# Patient Record
Sex: FEMALE | Race: WHITE | NOT HISPANIC OR LATINO | Employment: STUDENT | URBAN - METROPOLITAN AREA
[De-identification: names, ages, dates, MRNs, and addresses within clinical notes are randomized per-mention and may not be internally consistent; named-entity substitution may affect disease eponyms.]

---

## 2022-09-23 ENCOUNTER — TELEPHONE (OUTPATIENT)
Dept: PSYCHIATRY | Facility: CLINIC | Age: 22
End: 2022-09-23
Payer: COMMERCIAL

## 2022-09-23 NOTE — TELEPHONE ENCOUNTER
Spoke with pt and informed pt that we are not accepting new pt at this time     ----- Message from Kayleigh Rivera sent at 9/22/2022  2:31 PM CDT -----  Regarding: Voicemail  Called for np appointment

## 2022-09-27 ENCOUNTER — TELEPHONE (OUTPATIENT)
Dept: PSYCHIATRY | Facility: CLINIC | Age: 22
End: 2022-09-27
Payer: COMMERCIAL

## 2022-12-15 ENCOUNTER — OFFICE VISIT (OUTPATIENT)
Dept: PSYCHIATRY | Facility: CLINIC | Age: 22
End: 2022-12-15
Payer: COMMERCIAL

## 2022-12-15 DIAGNOSIS — F90.8 ADHD, ADULT RESIDUAL TYPE: Primary | ICD-10-CM

## 2022-12-15 DIAGNOSIS — Z63.9 FAMILY CIRCUMSTANCE: ICD-10-CM

## 2022-12-15 DIAGNOSIS — F32.A DEPRESSION, UNSPECIFIED DEPRESSION TYPE: ICD-10-CM

## 2022-12-15 PROCEDURE — 99211 OFF/OP EST MAY X REQ PHY/QHP: CPT | Mod: PBBFAC | Performed by: SOCIAL WORKER

## 2022-12-15 PROCEDURE — 90791 PR PSYCHIATRIC DIAGNOSTIC EVALUATION: ICD-10-PCS | Mod: S$GLB,,, | Performed by: SOCIAL WORKER

## 2022-12-15 PROCEDURE — 90791 PSYCH DIAGNOSTIC EVALUATION: CPT | Mod: S$GLB,,, | Performed by: SOCIAL WORKER

## 2022-12-15 PROCEDURE — 99999 PR PBB SHADOW E&M-EST. PATIENT-LVL I: CPT | Mod: PBBFAC,,, | Performed by: SOCIAL WORKER

## 2022-12-15 PROCEDURE — 99999 PR PBB SHADOW E&M-EST. PATIENT-LVL I: ICD-10-PCS | Mod: PBBFAC,,, | Performed by: SOCIAL WORKER

## 2022-12-15 SDOH — SOCIAL DETERMINANTS OF HEALTH (SDOH): PROBLEM RELATED TO PRIMARY SUPPORT GROUP, UNSPECIFIED: Z63.9
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
## 2023-02-13 ENCOUNTER — TELEPHONE (OUTPATIENT)
Dept: PSYCHIATRY | Facility: CLINIC | Age: 23
End: 2023-02-13
Payer: COMMERCIAL

## 2023-02-17 ENCOUNTER — TELEPHONE (OUTPATIENT)
Dept: PSYCHIATRY | Facility: CLINIC | Age: 23
End: 2023-02-17
Payer: COMMERCIAL

## 2023-03-07 ENCOUNTER — TELEPHONE (OUTPATIENT)
Dept: PSYCHIATRY | Facility: CLINIC | Age: 23
End: 2023-03-07
Payer: COMMERCIAL

## 2023-03-22 ENCOUNTER — TELEPHONE (OUTPATIENT)
Dept: PSYCHIATRY | Facility: CLINIC | Age: 23
End: 2023-03-22
Payer: COMMERCIAL

## 2023-10-19 ENCOUNTER — CLINICAL SUPPORT (OUTPATIENT)
Dept: REHABILITATION | Facility: HOSPITAL | Age: 23
End: 2023-10-19
Attending: OBSTETRICS & GYNECOLOGY
Payer: COMMERCIAL

## 2023-10-19 DIAGNOSIS — R35.0 URINARY FREQUENCY: ICD-10-CM

## 2023-10-19 DIAGNOSIS — N81.4 UTERINE PROLAPSE: ICD-10-CM

## 2023-10-19 DIAGNOSIS — M62.89 PELVIC FLOOR DYSFUNCTION: ICD-10-CM

## 2023-10-19 PROCEDURE — 97530 THERAPEUTIC ACTIVITIES: CPT | Mod: PN

## 2023-10-19 PROCEDURE — 97535 SELF CARE MNGMENT TRAINING: CPT | Mod: PN

## 2023-10-19 PROCEDURE — 97161 PT EVAL LOW COMPLEX 20 MIN: CPT | Mod: PN

## 2023-10-19 PROCEDURE — 97140 MANUAL THERAPY 1/> REGIONS: CPT | Mod: PN

## 2023-10-19 NOTE — PATIENT INSTRUCTIONS
"  URINARY URGE CONTROL   What to do when you "gotta go, gotta go"     FREEZE, BREATHE, SQUEEZE, repeat  Do this when you experience a strong urge to urinate and feel like you are going to leak to stop yourself from leaking.      FIRST - FREEZE: Do your best not to panic or rush to the toilet! You are more likely to leak if you do. Stop whatever you are doing, stand or sit quietly, and stay as calm as possible.     SECOND - BREATHE: Relax and take a few good deep breaths, letting it out slowly. This helps you further calm the nervous system and settles your bladder. Try thinking of something else to distract yourself from the urge (example: list categories of items like animals, fruits, cars, etc.)     THIRD - SQUEEZE: Do 5-10 "Quick Flicks" (quick LIFT and squeeze of pelvic floor muscles, followed by a full DROP). Pelvic floor contractions send a message to the bladder to relax and hold urine. Continue doing your best to remain calm and distract yourself from the urge.     FINALLY - REPEAT: Repeat this as many times as you need to on the way to the bathroom (i.e., every time the urge comes back). We only want to be walking calmly to the bathroom once the urge has passed. When you get inside and close the door behind you, repeat this process one last time so that you have enough time to get to the toilet and pull your pants down without rushing.        Remember......"Control your bladder before it controls YOU!"     DOUBLE VOIDING    Sometimes after you urinate, you may feel the urge to go again immediately or soon after. However, when you go back to the bathroom, only a few drops come out. This can be due to incomplete emptying of the bladder. Double voiding is a technique that may assist the bladder to empty more effectively when urine is left in the bladder at the end of urination. It involves passing urine more than once each time that you go to the toilet. This makes sure that the bladder is completely " empty.    Here are 3 strategies you can try to fully empty your bladder.  You do not have to do all of these things every single time. Find which ones work best for you.     Check to make sure your pelvic floor is relaxed, which is required for voiding completely.   Do a body scan - make sure your legs, buttocks, and abdominals are relaxed.  Take a couple deep, slow breaths to encourage your pelvic floor muscles to DROP (i.e., try diaphragmatic breathing).  Gently apply pressure over your bladder.  Change your pelvic position: lean forward, rock your pelvis forward and backward 2x and side to side 2x, stand up detention then sit back down 2x.     Wait at least 15-30 seconds to see if a second urine stream begins. If not, you may get up and leave the bathroom.

## 2023-10-19 NOTE — PROGRESS NOTES
[facility]   Pelvic Health Physical Therapy Initial Evaluation     Date: 10/19/2023   Name: Rain Garcia  Clinic Number: [id number]    Therapy Diagnosis:   Encounter Diagnoses   Name Primary?    Uterine prolapse     Urinary frequency      Physician: Maria Eugenia Pope MD    Physician Orders: PT Eval and Treat   Medical Diagnosis from Referral: Uterine prolapse [N81.4], Urinary frequency [R35.0]  Evaluation Date: 10/19/2023  Authorization Period Expiration: 2023  Plan of Care Expiration: 2023  Progress Note Due: 2023  Visit # / Visits authorized:  eval   FOTO: Issued Visit #: 1 /3    Precautions: Standard    Time In: 9:00  Time Out: 10:00  Total Appointment Time (timed & untimed codes): 60 minutes    SUBJECTIVE     Date of onset: 2021    History of current condition - Rain reports:  on 2021. Since delivery, she has had an increase frequency in urination and constipation. She states that nothing seems to be normal about her pelvic floor since delivery. She states that the baby was resting on her bladder. She has frequent UTI symptoms as well. She recently went to urgent care for a urinalysis and was negative for UTI however was continuing to experience symptoms. She reports intercourse has not been enjoyable however also not painful. She reports possible decrease in libido since pregnancy. She states that she is very dehydrated at times because when she drinks water she has an increase in urinary frequency. She notices that she can control her urge if she does not think about it, however once she thinks about voiding, she has a very strong urge. She has a perifit however has not used it recently. Would like to have another baby in the future, but would like to improve pelvic floor symptoms she is experiencing right now.    OB/GYN History: , caesarean, and painful periods, copper IUD (recently had strings trimmed). Chronic  UTIs  Sexually active? Yes  Pain with vaginal exams, intercourse or tampon use? Yes with tampon use    Bladder/Bowel History: slow stream, trouble emptying bladder completely, and constipation/straining for movement  Frequency of urination:   Daytime: every hour           Nighttime: 0-1  Difficulty initiating urine stream: No  Urine stream: weak and interrupted  Complete emptying: No  Bladder leakage: No  Frequency of incidents: none  Amount leaked (urine):  none  Urinary Urgency: Yes, Able to delay the urge for at least 30 minute(s).  Frequency of bowel movements: 1-2 times a day, sometimes she can't go a day but will then go the next morning. Mainly has her bowel movements in the morning.  Difficulty initiating BM: Yes. Straining  Quality/Shape of BM: Johnston Stool Chart Type 4 .   Does Patient Feel Empty after BM? No  Fiber Supplements or Laxative Use? No  Colon leakage: No  Frequency of incidents: none   Amount leaked (bowels):  none  Form of protection: none  Number of pads required in 24 hours: 0    Pain:  Low back   Currently 2/10    Imaging: see chart (nothing since pregnancy)     Prior Therapy: no  Social History:  lives with their family  Current exercise: no but trying. Would like to get a treadmill  Occupation: Bridal stylist  Prior Level of Function: WFL  Current Level of Function: WFL; limited to certain activities due to urinary frequency     Types of fluid intake: water, coffee, and energy drinks.  1-2 large cups of coffee, tea for dinner.    Diet: regular    Abuse/Neglect: No     Patients goals: habit building, decrease urinary frequency     Medical History: Rain  has no past medical history on file.     Surgical History: Rain Garcia  has a past surgical history that includes  section and Tonsillectomy.    Medications: Rain has a current medication list which includes the following prescription(s): fluconazole, nitrofurantoin (macrocrystal-monohydrate), and norethindrone, and the  following Facility-Administered Medications: copper intrauterine device.    Allergies:   Review of patient's allergies indicates:  No Known Allergies     OBJECTIVE     See EMR under MEDIA for written consent provided 10/19/2023    ORTHO SCREEN  Posture in sitting: slouched   Pelvic alignment: no sign of deviations noted in supine     ABDOMINAL WALL ASSESSMENT  Palpation: boggy and hypotonic   Abdominal strength: Transverse abdominus: poor to fair  Scarring:  scar noted. Good mobility on and around scar   Diastasis: assess next visit    BREATHING MECHANICS ASSESSMENT   Thorax Assessment During Quiet Respiration: WNL excursion of ribcage  Thorax Assessment During Deep Respiration: WNL excursion of ribcage and WNL excursion of abdominal wall    VAGINAL PELVIC FLOOR EXAM    EXTERNAL ASSESSMENT  Introitus: stenotic  Skin condition: WNL  Scarring: none noted  Sensation: WNL   Pain: none  Voluntary contraction: visible lift  Voluntary relaxation: nil  Involuntary contraction: nil  Bearing down: bulge and nil      INTERNAL ASSESSMENT  Pain: tender areas noted as follows: bilateral bulbospongiosus, bilateral superficial transverse perineal, bilateral obturator internus  (manual trigger point release performed)  Sensation: able to localized pressure appropriately   Vaginal vault: stenotic   Muscle Bulk: hypertonus   Muscle Power: 3/5  Muscle Endurance: 5 sec    Quality of contraction: slow relaxation and incomplete relaxation   Coordination: tends to hold breath during PFM contration   Prolapse check: none      Limitation/Restriction for FOTO Pelvic Floor Survey    Therapist reviewed FOTO scores for Rain Garcia on 10/19/2023.   FOTO documents entered into [employer] - see Media section.    Limitation Score:          TREATMENT     Total Treatment time (time-based codes) separate from Evaluation: 26 minutes       Manual Therapy to improve flexibility and extensibility for 10 minutes including:     Manual internal  trigger point release to the following: bilateral bulbospongiosus, bilateral superficial transverse perineal, bilateral obturator internus  - patient preforming diaphragmatic breathing     Therapeutic Activity to improve dynamic functional performance for 08 minutes including:    Double voiding techniques  Squatty potty posture when having a bowel movement to decrease straining     Self-Care/Home Management to improve behavioral/activity modifications related to ADLs, compensatory training, safety procedures, and adaptive equipment for 08 minutes including:    Education on relationship between transverse abdominus and pelvic floor muscles  Education on bladder irritants and handout given  Education on Overactive bladder and pelvic floor muscle downtraining    PATIENT EDUCATION AND HOME EXERCISES     Education provided:   general anatomy/physiology of urinary/ bowel  system, benefits of treatment, risks of treatment, and alternative methods of treatment were discussed with the patient. Additionally, bladder irritants, anatomy/physiology of pelvic floor, bladder retraining, diaphragmatic breathing, double voiding techniques, isometric abdominal exercises, kegels, proper bearing down techniques, fluid intake/dietary modifications, and behavior modifications were reviewed.     Written Home Exercises provided: yes.  Exercises were reviewed and Rain was able to demonstrate them prior to the end of the session. Rain demonstrated good  understanding of the education provided. See EMR under Patient Instructions for exercises provided during therapy sessions.    ASSESSMENT     Rain is a 23 y.o. female referred to outpatient Physical Therapy with a medical diagnosis of Uterine prolapse and Urinary frequency. Pt presents with altered posture, poor trunk stability, pelvic floor tenderness, decreased pelvic muscle strength, decreased endurance of the pelvic muscles, decreased phasic ability of the pelvic muscles, increased  tension of the pelvic muscles, poor quality of pelvic muscle contraction, increased frequency of urination, increased nocturia, poor fluid intake, and unable to co-contract or co-relax abdominal wall and pelvic floor muscles.      Patient prognosis is Good.   Patient will benefit from skilled outpatient Physical Therapy to address the deficits stated above and in the chart below, provide patient/family education, and to maximize patient's level of independence.     Plan of care discussed with patient: Yes  Patient's spiritual, cultural and educational needs considered and patient is agreeable to the plan of care and goals as stated below:     Anticipated Barriers for therapy: none    Medical Necessity is demonstrated by the following:    History  Co-morbidities and personal factors that may impact the plan of care [x] LOW: no personal factors / co-morbidities  [] MODERATE: 1-2 personal factors / co-morbidities  [] HIGH: 3+ personal factors / co-morbidities    Personal Factors:   no deficits     Examination  Body Structures and Functions, activity limitations and participation restrictions that may impact the plan of care [] LOW: addressing 1-2 elements  [x] MODERATE: 3+ elements  [] HIGH: 4+ elements (please support below)    Moderate / High Support Documentation: altered posture, poor trunk stability, pelvic floor tenderness, decreased pelvic muscle strength, decreased endurance of the pelvic muscles, decreased phasic ability of the pelvic muscles, increased tension of the pelvic muscles, poor quality of pelvic muscle contraction, increased frequency of urination, increased nocturia, poor fluid intake, and unable to co-contract or co-relax abdominal wall and pelvic floor muscles     Clinical Presentation [x] LOW: stable  [] MODERATE: Evolving  [] HIGH: Unstable     Decision Making/ Complexity Score: low       Goals:  Short Term Goals: 6 weeks   - Pt will demonstrate excellent knowledge and adherence to HEP to  facilitate optimal recovery.  - Pt will demonstrate proper PFM contraction, relaxation, and lengthening coordinated with TA and breath for improved muscle coordination needed for functional activity.  - Pt to demonstrate proper positioning on commode with breathing techniques to decrease strain with BM to enable pt to feel empty after BM.      Long Term Goals: 12 weeks   - Pt will demonstrate excellent knowledge and adherence to HEP for continued self-maintenance of symptoms.  - Pt to show proper breathing to promote relaxation and pelvic floor functional mobility and to help with calming the nervous system in order to decrease pain and improve QOL.  - Pt will report improved ability to initiate urine stream and completely empty without straining to demonstrate improved PF relaxation for proper voiding.  - Pt to report a decrease in urinary frequency only once every 2-4 hours to improve ability to participate in social activities.  - Pt will report FOTO score PFDI Urinary 15 limited or less indicating clinically relevant increase in function.  - Pt will report voiding interval of 2-3 hours for improved ADL tolerance.   - Pt will report bearing down appropriately 100% of the time for improved bowel function and decreased stress on adjacent pelvic structures.     PLAN     Plan of care Certification: 10/19/2023 to 12/28/2023.    Outpatient Physical Therapy 1 time(s) every 1-2 week(s) for 10 weeks to include the following interventions: Electrical Stimulation TENS/IFC, Manual Therapy, Moist Heat/ Ice, Neuromuscular Re-ed, Patient Education, Self Care, Therapeutic Activities, and Therapeutic Exercise.     Stevie Calero, PT      I CERTIFY THE NEED FOR THESE SERVICES FURNISHED UNDER THIS PLAN OF TREATMENT AND WHILE UNDER MY CARE   Physician's comments:     Physician's Signature: ___________________________________________________

## 2023-10-19 NOTE — PLAN OF CARE
[facility]   Pelvic Health Physical Therapy Initial Evaluation     Date: 10/19/2023   Name: Rain Garcia  Clinic Number: [id number]    Therapy Diagnosis:   Encounter Diagnoses   Name Primary?    Uterine prolapse     Urinary frequency      Physician: Maria Eugenia Pope MD    Physician Orders: PT Eval and Treat   Medical Diagnosis from Referral: Uterine prolapse [N81.4], Urinary frequency [R35.0]  Evaluation Date: 10/19/2023  Authorization Period Expiration: 2023  Plan of Care Expiration: 2023  Progress Note Due: 2023  Visit # / Visits authorized:  eval   FOTO: Issued Visit #: 1 /3    Precautions: Standard    Time In: 9:00  Time Out: 10:00  Total Appointment Time (timed & untimed codes): 60 minutes    SUBJECTIVE     Date of onset: 2021    History of current condition - Rain reports:  on 2021. Since delivery, she has had an increase frequency in urination and constipation. She states that nothing seems to be normal about her pelvic floor since delivery. She states that the baby was resting on her bladder. She has frequent UTI symptoms as well. She recently went to urgent care for a urinalysis and was negative for UTI however was continuing to experience symptoms. She reports intercourse has not been enjoyable however also not painful. She reports possible decrease in libido since pregnancy. She states that she is very dehydrated at times because when she drinks water she has an increase in urinary frequency. She notices that she can control her urge if she does not think about it, however once she thinks about voiding, she has a very strong urge. She has a perifit however has not used it recently. Would like to have another baby in the future, but would like to improve pelvic floor symptoms she is experiencing right now.    OB/GYN History: , caesarean, and painful periods, copper IUD (recently had strings trimmed). Chronic  UTIs  Sexually active? Yes  Pain with vaginal exams, intercourse or tampon use? Yes with tampon use    Bladder/Bowel History: slow stream, trouble emptying bladder completely, and constipation/straining for movement  Frequency of urination:   Daytime: every hour           Nighttime: 0-1  Difficulty initiating urine stream: No  Urine stream: weak and interrupted  Complete emptying: No  Bladder leakage: No  Frequency of incidents: none  Amount leaked (urine): none  Urinary Urgency: Yes, Able to delay the urge for at least 30 minute(s).  Frequency of bowel movements: 1-2 times a day, sometimes she can't go a day but will then go the next morning. Mainly has her bowel movements in the morning.  Difficulty initiating BM: Yes. Straining  Quality/Shape of BM: Catron Stool Chart Type 4.   Does Patient Feel Empty after BM? No  Fiber Supplements or Laxative Use? No  Colon leakage: No  Frequency of incidents: none   Amount leaked (bowels): none  Form of protection: none  Number of pads required in 24 hours: 0    Pain:  Low back   Currently 2/10    Imaging: see chart (nothing since pregnancy)     Prior Therapy: no  Social History:  lives with their family  Current exercise: no but trying. Would like to get a treadmill  Occupation: Bridal stylist  Prior Level of Function: WFL  Current Level of Function: WFL; limited to certain activities due to urinary frequency     Types of fluid intake: water, coffee, and energy drinks. 1-2 large cups of coffee, tea for dinner.    Diet: regular    Abuse/Neglect: No     Patients goals: habit building, decrease urinary frequency     Medical History: Rain  has no past medical history on file.     Surgical History: Rain Garcia  has a past surgical history that includes  section and Tonsillectomy.    Medications: Rain has a current medication list which includes the following prescription(s): fluconazole, nitrofurantoin (macrocrystal-monohydrate), and norethindrone, and the  following Facility-Administered Medications: copper intrauterine device.    Allergies:   Review of patient's allergies indicates:  No Known Allergies     OBJECTIVE     See EMR under MEDIA for written consent provided 10/19/2023    ORTHO SCREEN  Posture in sitting: slouched   Pelvic alignment: no sign of deviations noted in supine     ABDOMINAL WALL ASSESSMENT  Palpation: boggy and hypotonic   Abdominal strength: Transverse abdominus: poor to fair  Scarring:  scar noted. Good mobility on and around scar   Diastasis: assess next visit    BREATHING MECHANICS ASSESSMENT   Thorax Assessment During Quiet Respiration: WNL excursion of ribcage  Thorax Assessment During Deep Respiration: WNL excursion of ribcage and WNL excursion of abdominal wall    VAGINAL PELVIC FLOOR EXAM    EXTERNAL ASSESSMENT  Introitus: stenotic  Skin condition: WNL  Scarring: none noted  Sensation: WNL   Pain: none  Voluntary contraction: visible lift  Voluntary relaxation: nil  Involuntary contraction: nil  Bearing down: bulge and nil      INTERNAL ASSESSMENT  Pain: tender areas noted as follows: bilateral bulbospongiosus, bilateral superficial transverse perineal, bilateral obturator internus  (manual trigger point release performed)  Sensation: able to localized pressure appropriately   Vaginal vault: stenotic   Muscle Bulk: hypertonus   Muscle Power: 3/5  Muscle Endurance: 5 sec    Quality of contraction: slow relaxation and incomplete relaxation   Coordination: tends to hold breath during PFM contration   Prolapse check: none      Limitation/Restriction for FOTO Pelvic Floor Survey    Therapist reviewed FOTO scores for Rain Garcia on 10/19/2023.   FOTO documents entered into "[employer] - see Media section.    Limitation Score:          TREATMENT     Total Treatment time (time-based codes) separate from Evaluation: 26 minutes       Manual Therapy to improve flexibility and extensibility for 10 minutes including:     Manual internal  trigger point release to the following: bilateral bulbospongiosus, bilateral superficial transverse perineal, bilateral obturator internus  - patient preforming diaphragmatic breathing     Therapeutic Activity to improve dynamic functional performance for 08 minutes including:    Double voiding techniques  Squatty potty posture when having a bowel movement to decrease straining     Self-Care/Home Management to improve behavioral/activity modifications related to ADLs, compensatory training, safety procedures, and adaptive equipment for 08 minutes including:    Education on relationship between transverse abdominus and pelvic floor muscles  Education on bladder irritants and handout given  Education on Overactive bladder and pelvic floor muscle downtraining    PATIENT EDUCATION AND HOME EXERCISES     Education provided:   general anatomy/physiology of urinary/ bowel  system, benefits of treatment, risks of treatment, and alternative methods of treatment were discussed with the patient. Additionally, bladder irritants, anatomy/physiology of pelvic floor, bladder retraining, diaphragmatic breathing, double voiding techniques, isometric abdominal exercises, kegels, proper bearing down techniques, fluid intake/dietary modifications, and behavior modifications were reviewed.     Written Home Exercises provided: yes.  Exercises were reviewed and Rain was able to demonstrate them prior to the end of the session. Rain demonstrated good  understanding of the education provided. See EMR under Patient Instructions for exercises provided during therapy sessions.    ASSESSMENT     Rain is a 23 y.o. female referred to outpatient Physical Therapy with a medical diagnosis of Uterine prolapse and Urinary frequency. Pt presents with altered posture, poor trunk stability, pelvic floor tenderness, decreased pelvic muscle strength, decreased endurance of the pelvic muscles, decreased phasic ability of the pelvic muscles, increased  tension of the pelvic muscles, poor quality of pelvic muscle contraction, increased frequency of urination, increased nocturia, poor fluid intake, and unable to co-contract or co-relax abdominal wall and pelvic floor muscles.      Patient prognosis is Good.   Patient will benefit from skilled outpatient Physical Therapy to address the deficits stated above and in the chart below, provide patient/family education, and to maximize patient's level of independence.     Plan of care discussed with patient: Yes  Patient's spiritual, cultural and educational needs considered and patient is agreeable to the plan of care and goals as stated below:     Anticipated Barriers for therapy: none    Medical Necessity is demonstrated by the following:    History  Co-morbidities and personal factors that may impact the plan of care [x] LOW: no personal factors / co-morbidities  [] MODERATE: 1-2 personal factors / co-morbidities  [] HIGH: 3+ personal factors / co-morbidities    Personal Factors:   no deficits     Examination  Body Structures and Functions, activity limitations and participation restrictions that may impact the plan of care [] LOW: addressing 1-2 elements  [x] MODERATE: 3+ elements  [] HIGH: 4+ elements (please support below)    Moderate / High Support Documentation: altered posture, poor trunk stability, pelvic floor tenderness, decreased pelvic muscle strength, decreased endurance of the pelvic muscles, decreased phasic ability of the pelvic muscles, increased tension of the pelvic muscles, poor quality of pelvic muscle contraction, increased frequency of urination, increased nocturia, poor fluid intake, and unable to co-contract or co-relax abdominal wall and pelvic floor muscles     Clinical Presentation [x] LOW: stable  [] MODERATE: Evolving  [] HIGH: Unstable     Decision Making/ Complexity Score: low       Goals:  Short Term Goals: 6 weeks   - Pt will demonstrate excellent knowledge and adherence to HEP to  facilitate optimal recovery.  - Pt will demonstrate proper PFM contraction, relaxation, and lengthening coordinated with TA and breath for improved muscle coordination needed for functional activity.  - Pt to demonstrate proper positioning on commode with breathing techniques to decrease strain with BM to enable pt to feel empty after BM.      Long Term Goals: 12 weeks   - Pt will demonstrate excellent knowledge and adherence to HEP for continued self-maintenance of symptoms.  - Pt to show proper breathing to promote relaxation and pelvic floor functional mobility and to help with calming the nervous system in order to decrease pain and improve QOL.  - Pt will report improved ability to initiate urine stream and completely empty without straining to demonstrate improved PF relaxation for proper voiding.  - Pt to report a decrease in urinary frequency only once every 2-4 hours to improve ability to participate in social activities.  - Pt will report FOTO score PFDI Urinary 15 limited or less indicating clinically relevant increase in function.  - Pt will report voiding interval of 2-3 hours for improved ADL tolerance.   - Pt will report bearing down appropriately 100% of the time for improved bowel function and decreased stress on adjacent pelvic structures.     PLAN     Plan of care Certification: 10/19/2023 to 12/28/2023.    Outpatient Physical Therapy 1 time(s) every 1-2 week(s) for 10 weeks to include the following interventions: Electrical Stimulation TENS/IFC, Manual Therapy, Moist Heat/ Ice, Neuromuscular Re-ed, Patient Education, Self Care, Therapeutic Activities, and Therapeutic Exercise.     Stevie Calero, PT      I CERTIFY THE NEED FOR THESE SERVICES FURNISHED UNDER THIS PLAN OF TREATMENT AND WHILE UNDER MY CARE   Physician's comments:     Physician's Signature: ___________________________________________________

## 2023-10-25 NOTE — PROGRESS NOTES
"  Pelvic Health Physical Therapy   Treatment Note     Name: Rain Garcia  Clinic Number: [id number]    Therapy Diagnosis:   Encounter Diagnosis   Name Primary?    Pelvic floor dysfunction Yes     Physician: Maria Eugenia Pope MD    Visit Date: 10/26/2023       Physician Orders: PT Eval and Treat   Medical Diagnosis from Referral: Uterine prolapse [N81.4], Urinary frequency [R35.0]  Evaluation Date: 10/19/2023  Authorization Period Expiration: 12/31/2023  Plan of Care Expiration: 12/28/2023  Progress Note Due: 11/19/2023  Visit # / Visits authorized: 1/20 + eval   FOTO: Issued Visit #: 1 /3     Precautions: Standard     Time In: 9:48  Time Out: 10:24  Total Appointment Time (timed & untimed codes): 36 minutes    Subjective     Pt reports: been doing good. Already seeing an improvement. She is doing her exercises as the day goes by. She did her bladder diary yesterday and reports voiding 7 times plus having 2 bowel movements that was 2 hours apart. Last night she did have some pain during intercourse and describes it as hitting a wall. She states that the double voiding has been helping. She is currently drinking most of her water intake from 6-8 at night. She states that she does do a lot of just in case peeing at work especially on Saturday's.    She was compliant with home exercise program.  Response to previous treatment: no adverse symptoms   Functional change: no change    Pain: 0/10  Location: generalized     Objective     Objective Measures updated at progress report unless specified.     Treatment     Rain participated in neuromuscular re-education activities to develop Coordination, Control, Down training, Proprioception, and Sense for 26 minutes including:     Diaphragmatic breathing  TA contractions in supine with "shhh" technique  Bridges with TA contraction + DB x15  Cat cow stretch with DB  Maribel pose with DB  Happy baby stretch with DB- patient able to feel "pelvic drop" with relaxation    Rain " "participated in dynamic functional therapeutic activities to improve functional performance for 10  minutes, including:    Education on colon massage and demonstrated technique. Handout given.  Educated on decreasing "just in case" voiding. Talked about only voiding every other appointment on Saturday's.    Educated patient on types of lubrication.       Home Exercises Provided and Patient Education Provided     Education provided:   - bladder irritants, anatomy/physiology of pelvic floor, bladder retraining, diaphragmatic breathing, double voiding techniques, isometric abdominal exercises, kegels, perineal stretching/massage, proper bearing down techniques, and fluid intake/dietary modifications  Discussed progression of plan of care with patient; educated pt in activity modification; reviewed HEP with pt. Pt demonstrated and verbalized understanding of all instruction and was provided with a handout of HEP (see Patient Instructions).   - HEP provided prior visit.     Written Home Exercises Provided: yes.  Exercises were reviewed and Rain was able to demonstrate them prior to the end of the session.  Rain demonstrated good  understanding of the education provided.     See EMR under Media for exercises provided prior visit.    Assessment     Rain tolerated therapy session well with no new complaints of pain. Focused on pelvic floor relaxation and down training with flexibility interventions. Also focused on core stabilization and strengthening. Educated patient on colon massage and ways to decrease "just in case" voiding. Patient reports feeling a good "pelvic drop" with happy baby stretch. Continue progressing in POC as tolerated.   Rain Is progressing well towards her goals.   Pt prognosis is Good.     Pt will continue to benefit from skilled outpatient physical therapy to address the deficits listed in the problem list box on initial evaluation, provide pt/family education and to maximize pt's level of " independence in the home and community environment.     Pt's spiritual, cultural and educational needs considered and pt agreeable to plan of care and goals.     Anticipated barriers to physical therapy: none    Goals:  Short Term Goals: 5 weeks   - Pt will demonstrate excellent knowledge and adherence to HEP to facilitate optimal recovery. Progressing  - Pt will demonstrate proper PFM contraction, relaxation, and lengthening coordinated with TA and breath for improved muscle coordination needed for functional activity. Progressing  - Pt to demonstrate proper positioning on commode with breathing techniques to decrease strain with BM to enable pt to feel empty after BM. Progressing        Long Term Goals: 10 weeks   - Pt will demonstrate excellent knowledge and adherence to HEP for continued self-maintenance of symptoms. Progressing  - Pt to show proper breathing to promote relaxation and pelvic floor functional mobility and to help with calming the nervous system in order to decrease pain and improve QOL. Progressing  - Pt will report improved ability to initiate urine stream and completely empty without straining to demonstrate improved PF relaxation for proper voiding. Progressing  - Pt to report a decrease in urinary frequency only once every 2-4 hours to improve ability to participate in social activities. Progressing  - Pt will report FOTO score PFDI Urinary 15 limited or less indicating clinically relevant increase in function. Progressing  - Pt will report voiding interval of 2-3 hours for improved ADL tolerance. Progressing  - Pt will report bearing down appropriately 100% of the time for improved bowel function and decreased stress on adjacent pelvic structures. Progressing     PLAN      Plan of care Certification: 10/19/2023 to 12/28/2023.     Outpatient Physical Therapy 1 time(s) every 1-2 week(s) for 10 weeks to include the following interventions: Electrical Stimulation TENS/IFC, Manual Therapy, Moist  Heat/ Ice, Neuromuscular Re-ed, Patient Education, Self Care, Therapeutic Activities, and Therapeutic Exercise.     Stevie Calero, PT

## 2023-10-26 ENCOUNTER — CLINICAL SUPPORT (OUTPATIENT)
Dept: REHABILITATION | Facility: HOSPITAL | Age: 23
End: 2023-10-26
Payer: COMMERCIAL

## 2023-10-26 DIAGNOSIS — M62.89 PELVIC FLOOR DYSFUNCTION: Primary | ICD-10-CM

## 2023-10-26 PROCEDURE — 97530 THERAPEUTIC ACTIVITIES: CPT | Mod: PN

## 2023-10-26 PROCEDURE — 97112 NEUROMUSCULAR REEDUCATION: CPT | Mod: PN

## 2023-11-01 NOTE — PROGRESS NOTES
"  Pelvic Health Physical Therapy   Treatment Note     Name: Rain Garcia  Clinic Number: [id number]    Therapy Diagnosis:   Encounter Diagnosis   Name Primary?    Pelvic floor dysfunction Yes       Physician: Maria Eugenia Pope MD    Visit Date: 11/2/2023       Physician Orders: PT Eval and Treat   Medical Diagnosis from Referral: Uterine prolapse [N81.4], Urinary frequency [R35.0]  Evaluation Date: 10/19/2023  Authorization Period Expiration: 12/31/2023  Plan of Care Expiration: 12/28/2023  Progress Note Due: 11/19/2023  Visit # / Visits authorized: 2/20 + eval   FOTO: Issued Visit #: 1 /3     Precautions: Standard     Time In: 9:05  Time Out: 9:49  Total Appointment Time (timed & untimed codes): 44 minutes    Subjective     Pt reports: was not able to perform much of her exercises this week. She notices that she urinates and performs BM's at work usually and not as frequently at home. She was able to decrease her just in case peeing at work. Improvements in in urinary and bowel urgency as well. She has been having an increase in lower back pain. Her pain will usually last 2 weeks and is on and off. Reports discomfort with intercourse yesterday with in and out motions however no pain with previous intercourse earlier in the week.     She was compliant with home exercise program.  Response to previous treatment: no adverse symptoms   Functional change: no change    Pain: 4/10   Location: low back     Objective     Objective Measures updated at progress report unless specified.     Treatment     Rain participated in neuromuscular re-education activities to develop Coordination, Control, Down training, Proprioception, and Sense for 36 minutes including:     Diaphragmatic breathing  TA contractions in supine with "shhh" technique  Bridges with TA contraction + DB x20  Posterior pelvic tilts with DB x20  Cat cow stretch with DB  Maribel pose with DB  Happy baby stretch with DB- patient able to feel "pelvic drop" " "with relaxation  Seated therapy ball exercises- posterior pelvic tilts and lateral tilts     Rain participated in dynamic functional therapeutic activities to improve functional performance for 00 minutes, including:    Education on colon massage and demonstrated technique. Handout given.  Educated on decreasing "just in case" voiding. Talked about only voiding every other appointment on Saturday's.    Educated patient on types of lubrication.     Rain received the following manual therapy techniques: to develop flexibility and extensibility for 08 minutes including:     Lumbar PA mobilizations  Sidelying Lumbar mobilizations  Lumbar manipulation YEFRI        Home Exercises Provided and Patient Education Provided     Education provided:   - bladder irritants, anatomy/physiology of pelvic floor, bladder retraining, diaphragmatic breathing, double voiding techniques, isometric abdominal exercises, kegels, perineal stretching/massage, proper bearing down techniques, and fluid intake/dietary modifications  Discussed progression of plan of care with patient; educated pt in activity modification; reviewed HEP with pt. Pt demonstrated and verbalized understanding of all instruction and was provided with a handout of HEP (see Patient Instructions).   - HEP provided prior visit.     Written Home Exercises Provided: yes.  Exercises were reviewed and Rain was able to demonstrate them prior to the end of the session.  Rain demonstrated good  understanding of the education provided.     See EMR under Media for exercises provided prior visit.    Assessment     Rain presents to therapy today with reports of improvement in urinary and bowel urgency and frequency. She is also reporting an increase in low back pain today. Performed lumbar mobilizations in sidelying manipulation position. Patient with good tolerance and reporting stretching sensation in lumbar paraspinals. Continuing to focus on pelvic floor relaxation with deep " breathing as well as core strengthening. Will progressing core interventions next week. Continue progressing in POC as tolerated.     Rain Is progressing well towards her goals.   Pt prognosis is Good.     Pt will continue to benefit from skilled outpatient physical therapy to address the deficits listed in the problem list box on initial evaluation, provide pt/family education and to maximize pt's level of independence in the home and community environment.     Pt's spiritual, cultural and educational needs considered and pt agreeable to plan of care and goals.     Anticipated barriers to physical therapy: none    Goals:  Short Term Goals: 5 weeks   - Pt will demonstrate excellent knowledge and adherence to HEP to facilitate optimal recovery. Progressing  - Pt will demonstrate proper PFM contraction, relaxation, and lengthening coordinated with TA and breath for improved muscle coordination needed for functional activity. Progressing  - Pt to demonstrate proper positioning on commode with breathing techniques to decrease strain with BM to enable pt to feel empty after BM. Progressing        Long Term Goals: 10 weeks   - Pt will demonstrate excellent knowledge and adherence to HEP for continued self-maintenance of symptoms. Progressing  - Pt to show proper breathing to promote relaxation and pelvic floor functional mobility and to help with calming the nervous system in order to decrease pain and improve QOL. Progressing  - Pt will report improved ability to initiate urine stream and completely empty without straining to demonstrate improved PF relaxation for proper voiding. Progressing  - Pt to report a decrease in urinary frequency only once every 2-4 hours to improve ability to participate in social activities. Progressing  - Pt will report FOTO score PFDI Urinary 15 limited or less indicating clinically relevant increase in function. Progressing  - Pt will report voiding interval of 2-3 hours for improved ADL  tolerance. Progressing  - Pt will report bearing down appropriately 100% of the time for improved bowel function and decreased stress on adjacent pelvic structures. Progressing     PLAN      Plan of care Certification: 10/19/2023 to 12/28/2023.     Outpatient Physical Therapy 1 time(s) every 1-2 week(s) for 10 weeks to include the following interventions: Electrical Stimulation TENS/IFC, Manual Therapy, Moist Heat/ Ice, Neuromuscular Re-ed, Patient Education, Self Care, Therapeutic Activities, and Therapeutic Exercise.     Stevie Calero, PT

## 2023-11-02 ENCOUNTER — CLINICAL SUPPORT (OUTPATIENT)
Dept: REHABILITATION | Facility: HOSPITAL | Age: 23
End: 2023-11-02
Payer: COMMERCIAL

## 2023-11-02 DIAGNOSIS — M62.89 PELVIC FLOOR DYSFUNCTION: Primary | ICD-10-CM

## 2023-11-02 PROCEDURE — 97112 NEUROMUSCULAR REEDUCATION: CPT | Mod: PN

## 2023-11-02 PROCEDURE — 97140 MANUAL THERAPY 1/> REGIONS: CPT | Mod: PN

## 2023-11-09 ENCOUNTER — CLINICAL SUPPORT (OUTPATIENT)
Dept: REHABILITATION | Facility: HOSPITAL | Age: 23
End: 2023-11-09
Payer: COMMERCIAL

## 2023-11-09 DIAGNOSIS — M62.89 PELVIC FLOOR DYSFUNCTION: Primary | ICD-10-CM

## 2023-11-09 PROCEDURE — 97112 NEUROMUSCULAR REEDUCATION: CPT | Mod: PN

## 2023-11-09 PROCEDURE — 97140 MANUAL THERAPY 1/> REGIONS: CPT | Mod: PN

## 2023-11-09 NOTE — PROGRESS NOTES
"  Pelvic Health Physical Therapy   Treatment Note     Name: Rain Garcia  Clinic Number: [id number]    Therapy Diagnosis:   Encounter Diagnosis   Name Primary?    Pelvic floor dysfunction Yes         Physician: Maria Eugenia Pope MD    Visit Date: 11/9/2023       Physician Orders: PT Eval and Treat   Medical Diagnosis from Referral: Uterine prolapse [N81.4], Urinary frequency [R35.0]  Evaluation Date: 10/19/2023  Authorization Period Expiration: 12/31/2023  Plan of Care Expiration: 12/28/2023  Progress Note Due: 11/19/2023 NEXT VISIT  Visit # / Visits authorized: 3/20 + eval   FOTO: Issued Visit #: 1 /3     Precautions: Standard     Time In: 10:35  Time Out: 11:15  Total Appointment Time (timed & untimed codes): 40 minutes    Subjective     Pt reports: bad cramping during cycle this week. Was having a BM every hour on Tuesday, however that has resolved. No pain or discomfort during intercourse.     She was compliant with home exercise program.  Response to previous treatment: no adverse symptoms   Functional change: no change    Pain: 4/10    Location: low back     Objective     Objective Measures updated at progress report unless specified.     Treatment     Rain participated in neuromuscular re-education activities to develop Coordination, Control, Down training, Proprioception, and Sense for 30 minutes including:       Prone press ups with Diaphragmatic breathing  Supine piriformis stretch modified  Supine piriformis stretch with added leg raise  Lapoint stretch - noted increased tension on L > R  Diaphragmatic breathing  TA contractions in supine with "shhh" technique x10  Happy Baby pose 2 min  Dead bug - slight increase in low back pain   Bird dog - slight pelvic drop on the right     Not today:  Bridges with TA contraction + DB x20  Posterior pelvic tilts with DB x20  Cat cow stretch with DB  Maribel pose with DB  Happy baby stretch with DB- patient able to feel "pelvic drop" with relaxation  Seated " "therapy ball exercises- posterior pelvic tilts and lateral tilts     Rain participated in dynamic functional therapeutic activities to improve functional performance for 00 minutes, including:    Education on colon massage and demonstrated technique. Handout given.  Educated on decreasing "just in case" voiding. Talked about only voiding every other appointment on Saturday's.    Educated patient on types of lubrication.     Rain received the following manual therapy techniques: to develop flexibility and extensibility for 10 minutes including:     Lumbar PA mobilizations  STM to bilateral lumbar paraspinals         Home Exercises Provided and Patient Education Provided     Education provided:   - bladder irritants, anatomy/physiology of pelvic floor, bladder retraining, diaphragmatic breathing, double voiding techniques, isometric abdominal exercises, kegels, perineal stretching/massage, proper bearing down techniques, and fluid intake/dietary modifications  Discussed progression of plan of care with patient; educated pt in activity modification; reviewed HEP with pt. Pt demonstrated and verbalized understanding of all instruction and was provided with a handout of HEP (see Patient Instructions).   - HEP provided prior visit.     Written Home Exercises Provided: yes.  Exercises were reviewed and Rain was able to demonstrate them prior to the end of the session.  Rain demonstrated good  understanding of the education provided.     See EMR under Media for exercises provided prior visit.    Assessment     Rain tolerated therapy session well with no new complaints of pain. Progressed core strengthening exercises today and patient showing good tolerance. Patient reporting an increase in low back pain recently, therefore, manual soft tissue mobilization and joint mobs were performed to lumbar region. Continuing to include pelvic floor relaxation into routine to improve tone in pelvic floor muscles. Continue " progressing in POC as tolerated.     Rain Is progressing well towards her goals.   Pt prognosis is Good.     Pt will continue to benefit from skilled outpatient physical therapy to address the deficits listed in the problem list box on initial evaluation, provide pt/family education and to maximize pt's level of independence in the home and community environment.     Pt's spiritual, cultural and educational needs considered and pt agreeable to plan of care and goals.     Anticipated barriers to physical therapy: none    Goals:  Short Term Goals: 5 weeks   - Pt will demonstrate excellent knowledge and adherence to HEP to facilitate optimal recovery. Progressing  - Pt will demonstrate proper PFM contraction, relaxation, and lengthening coordinated with TA and breath for improved muscle coordination needed for functional activity. Progressing  - Pt to demonstrate proper positioning on commode with breathing techniques to decrease strain with BM to enable pt to feel empty after BM. Progressing        Long Term Goals: 10 weeks   - Pt will demonstrate excellent knowledge and adherence to HEP for continued self-maintenance of symptoms. Progressing  - Pt to show proper breathing to promote relaxation and pelvic floor functional mobility and to help with calming the nervous system in order to decrease pain and improve QOL. Progressing  - Pt will report improved ability to initiate urine stream and completely empty without straining to demonstrate improved PF relaxation for proper voiding. Progressing  - Pt to report a decrease in urinary frequency only once every 2-4 hours to improve ability to participate in social activities. Progressing  - Pt will report FOTO score PFDI Urinary 15 limited or less indicating clinically relevant increase in function. Progressing  - Pt will report voiding interval of 2-3 hours for improved ADL tolerance. Progressing  - Pt will report bearing down appropriately 100% of the time for improved  bowel function and decreased stress on adjacent pelvic structures. Progressing     PLAN      Plan of care Certification: 10/19/2023 to 12/28/2023.     Outpatient Physical Therapy 1 time(s) every 1-2 week(s) for 10 weeks to include the following interventions: Electrical Stimulation TENS/IFC, Manual Therapy, Moist Heat/ Ice, Neuromuscular Re-ed, Patient Education, Self Care, Therapeutic Activities, and Therapeutic Exercise.     Stevie Calero, PT

## 2023-11-15 NOTE — PROGRESS NOTES
Pelvic Health Physical Therapy   Treatment Note     Name: Rain Garcia  Clinic Number: [id number]    Therapy Diagnosis:   Encounter Diagnosis   Name Primary?    Pelvic floor dysfunction Yes           Physician: Maria Eugenia Pope MD    Visit Date: 11/16/2023       Physician Orders: PT Eval and Treat   Medical Diagnosis from Referral: Uterine prolapse [N81.4], Urinary frequency [R35.0]  Evaluation Date: 10/19/2023  Authorization Period Expiration: 12/31/2023  Plan of Care Expiration: 12/28/2023  Progress Note Due: 11/19/2023 NEXT VISIT  Visit # / Visits authorized: 4/20 + eval   FOTO: Issued Visit #: 2 /3    Limitation/Restriction for FOTO Pelvic Floor Survey     Therapist reviewed FOTO scores for Rain Garcia on 10/19/2023.   FOTO documents entered into [employer] - see Media section.     Limitation Score:               Precautions: Standard     Time In: 9:47   Time Out: 10:35   Total Appointment Time (timed & untimed codes): 48  minutes    Subjective     Pt reports: ran a mile recently and afterwards had a lot of back pain the next morning and soreness in her legs. Bladder and bowel symptoms have improved, however not perfect.     She was compliant with home exercise program.  Response to previous treatment: no adverse symptoms   Functional change: no change    Pain: 2/10     Location: low back     Objective     Objective Measures updated at progress report unless specified.      Treatment     Rain participated in neuromuscular re-education activities to develop Coordination, Control, Down training, Proprioception, and Sense for 48  minutes including:     Return to running exercises:  Single leg bridge x10 ea.  Single leg heel raises x10 ea.  Single leg squats to mat x10 ea.  Sidelying hip abduction x10 ea.     Double leg heel raises x10  Swissball rollouts 10x ea. Direction   TRX squats 3x10  Paloff press purple band 2x10 ea.  RDLs with toe support x10 ea.   Diaphragmatic breathing  Happy Baby pose 2  "min  Posterior pelvic tilts with DB + TA contraction x10  Supine piriformis stretch with added leg raise 2x30"  [place] stretch x30" ea.     Next visit: side planks    Not today:  Prone press ups with Diaphragmatic breathing  Supine piriformis stretch modified  [place] stretch - noted increased tension on L > R  TA contractions in supine with "shhh" technique x10  Dead bug - slight increase in low back pain   Bird dog - slight pelvic drop on the right   Bridges with TA contraction + DB x20  Cat cow stretch with DB  Maribel pose with DB  Seated therapy ball exercises- posterior pelvic tilts and lateral tilts     Rain participated in dynamic functional therapeutic activities to improve functional performance for 00 minutes, including:    Education on colon massage and demonstrated technique. Handout given.  Educated on decreasing "just in case" voiding. Talked about only voiding every other appointment on Saturday's.    Educated patient on types of lubrication.     Rain received the following manual therapy techniques: to develop flexibility and extensibility for 00  minutes including:     Lumbar PA mobilizations  STM to bilateral lumbar paraspinals         Home Exercises Provided and Patient Education Provided     Education provided:   - bladder irritants, anatomy/physiology of pelvic floor, bladder retraining, diaphragmatic breathing, double voiding techniques, isometric abdominal exercises, kegels, perineal stretching/massage, proper bearing down techniques, and fluid intake/dietary modifications  Discussed progression of plan of care with patient; educated pt in activity modification; reviewed HEP with pt. Pt demonstrated and verbalized understanding of all instruction and was provided with a handout of HEP (see Patient Instructions).   - HEP provided prior visit.     Written Home Exercises Provided: yes.  Exercises were reviewed and Rain was able to demonstrate them prior to the end of the session.  Rain " demonstrated good  understanding of the education provided.     See EMR under Media for exercises provided prior visit.    Assessment     Rain tolerated therapy session well with no new complaints of pain. Recent return to running since after pregnancy and patient reporting an increase in low back pain afterwards. Performed specific exercises from return to running protocol to strengthening overall lower extremity musculature. Also focused on core strengthening and stabilization as well. Continue progressing in POC as tolerated.     Rain Is progressing well towards her goals.   Pt prognosis is Good.     Pt will continue to benefit from skilled outpatient physical therapy to address the deficits listed in the problem list box on initial evaluation, provide pt/family education and to maximize pt's level of independence in the home and community environment.     Pt's spiritual, cultural and educational needs considered and pt agreeable to plan of care and goals.     Anticipated barriers to physical therapy: none    Goals:  Short Term Goals: 5 weeks   - Pt will demonstrate excellent knowledge and adherence to HEP to facilitate optimal recovery. Progressing  - Pt will demonstrate proper PFM contraction, relaxation, and lengthening coordinated with TA and breath for improved muscle coordination needed for functional activity. Progressing  - Pt to demonstrate proper positioning on commode with breathing techniques to decrease strain with BM to enable pt to feel empty after BM. Progressing        Long Term Goals: 10 weeks   - Pt will demonstrate excellent knowledge and adherence to HEP for continued self-maintenance of symptoms. Progressing  - Pt to show proper breathing to promote relaxation and pelvic floor functional mobility and to help with calming the nervous system in order to decrease pain and improve QOL. Progressing  - Pt will report improved ability to initiate urine stream and completely empty without  straining to demonstrate improved PF relaxation for proper voiding. Progressing  - Pt to report a decrease in urinary frequency only once every 2-4 hours to improve ability to participate in social activities. Progressing  - Pt will report FOTO score PFDI Urinary 15 limited or less indicating clinically relevant increase in function. Progressing  - Pt will report voiding interval of 2-3 hours for improved ADL tolerance. Progressing  - Pt will report bearing down appropriately 100% of the time for improved bowel function and decreased stress on adjacent pelvic structures. Progressing     PLAN      Plan of care Certification: 10/19/2023 to 12/28/2023.     Outpatient Physical Therapy 1 time(s) every 1-2 week(s) for 10 weeks to include the following interventions: Electrical Stimulation TENS/IFC, Manual Therapy, Moist Heat/ Ice, Neuromuscular Re-ed, Patient Education, Self Care, Therapeutic Activities, and Therapeutic Exercise.     Stevie Calero, PT

## 2023-11-16 ENCOUNTER — CLINICAL SUPPORT (OUTPATIENT)
Dept: REHABILITATION | Facility: HOSPITAL | Age: 23
End: 2023-11-16
Payer: COMMERCIAL

## 2023-11-16 DIAGNOSIS — M62.89 PELVIC FLOOR DYSFUNCTION: Primary | ICD-10-CM

## 2023-11-16 PROCEDURE — 97112 NEUROMUSCULAR REEDUCATION: CPT | Mod: PN

## 2023-11-30 ENCOUNTER — CLINICAL SUPPORT (OUTPATIENT)
Dept: REHABILITATION | Facility: HOSPITAL | Age: 23
End: 2023-11-30
Payer: COMMERCIAL

## 2023-11-30 DIAGNOSIS — M62.89 PELVIC FLOOR DYSFUNCTION: Primary | ICD-10-CM

## 2023-11-30 PROCEDURE — 97140 MANUAL THERAPY 1/> REGIONS: CPT | Mod: PN

## 2023-11-30 PROCEDURE — 97112 NEUROMUSCULAR REEDUCATION: CPT | Mod: PN

## 2023-11-30 PROCEDURE — 97010 HOT OR COLD PACKS THERAPY: CPT | Mod: PN

## 2023-11-30 NOTE — PROGRESS NOTES
Pelvic Health Physical Therapy   Treatment Note     Name: Rain Garcia  Clinic Number: [id number]    Therapy Diagnosis:   Encounter Diagnosis   Name Primary?    Pelvic floor dysfunction Yes             Physician: Maria Eugenia Pope MD    Visit Date: 11/30/2023       Physician Orders: PT Eval and Treat   Medical Diagnosis from Referral: Uterine prolapse [N81.4], Urinary frequency [R35.0]  Evaluation Date: 10/19/2023  Authorization Period Expiration: 12/31/2023  Plan of Care Expiration: 12/28/2023  Progress Note Due: 12/19/2023  Visit # / Visits authorized: 5/20 + eval   FOTO: Issued Visit #: 2 /3    Limitation/Restriction for FOTO Pelvic Floor Survey     Therapist reviewed FOTO scores for Rain Garcia on 10/19/2023.   FOTO documents entered into [employer] - see Media section.     Limitation Score:               Precautions: Standard     Time In: 9:46  Time Out: 10:35  Total Appointment Time (timed & untimed codes): 49 minutes    Subjective     Pt reports: pain in low back since recent steroid shot. She reports a decrease in urinary frequency last week versus the week before. Recently purchased a treadmill. As only ran once since last visit.    She was compliant with home exercise program.  Response to previous treatment: no adverse symptoms   Functional change: no change    Pain: 5/10     Location: low back     Objective     Objective Measures updated at progress report unless specified.      Treatment     Rain participated in neuromuscular re-education activities to develop Coordination, Control, Down training, Proprioception, and Sense for 29 minutes including:     Diaphragmatic breathing x15  TA contractions in supine x15  LTRs with deep breathing x20  Bridges on ball with TA contraction and deep breathing technique 2x10    Next visit: side planks, paloff press seated on ball     Not today:  Return to running exercises:  Single leg bridge x10 ea.  Single leg heel raises x10 ea.  Single leg squats to mat  "x10 ea.  Sidelying hip abduction x10 ea.     Double leg heel raises x10  Swissball rollouts 10x ea. Direction   TRX squats 3x10  Paloff press purple band 2x10 ea.  RDLs with toe support x10 ea.   Diaphragmatic breathing  Happy Baby pose 2 min  Posterior pelvic tilts with DB + TA contraction x10  Supine piriformis stretch with added leg raise 2x30"  [place] stretch x30" ea.     Not today:  Prone press ups with Diaphragmatic breathing  Supine piriformis stretch modified  [place] stretch - noted increased tension on L > R  Dead bug - slight increase in low back pain   Bird dog - slight pelvic drop on the right   Bridges with TA contraction + DB x20  Cat cow stretch with DB  Maribel pose with DB  Seated therapy ball exercises- posterior pelvic tilts and lateral tilts     Rain participated in dynamic functional therapeutic activities to improve functional performance for 00 minutes, including:    Education on colon massage and demonstrated technique. Handout given.  Educated on decreasing "just in case" voiding. Talked about only voiding every other appointment on Saturday's.    Educated patient on types of lubrication.     Rain received the following manual therapy techniques: to develop flexibility and extensibility for 10  minutes including:     STM to bilateral lumbar paraspinals     Hot pack 10 minutes to low back in prone.       Home Exercises Provided and Patient Education Provided     Education provided:   - bladder irritants, anatomy/physiology of pelvic floor, bladder retraining, diaphragmatic breathing, double voiding techniques, isometric abdominal exercises, kegels, perineal stretching/massage, proper bearing down techniques, and fluid intake/dietary modifications  Discussed progression of plan of care with patient; educated pt in activity modification; reviewed HEP with pt. Pt demonstrated and verbalized understanding of all instruction and was provided with a handout of HEP (see Patient Instructions).   - " HEP provided prior visit.     Written Home Exercises Provided: yes.  Exercises were reviewed and Rain was able to demonstrate them prior to the end of the session.  Rain demonstrated good  understanding of the education provided.     See EMR under Media for exercises provided prior visit.    Assessment     Rain tolerated therapy session well with reports of increased low back pain. Manual soft tissue mobilization performed to address concern. Patient having good tolerance to manual techniques. Continued focus on relaxation as well as core strengthening. Continue progressing in POC as tolerated.     Rain Is progressing well towards her goals.   Pt prognosis is Good.     Pt will continue to benefit from skilled outpatient physical therapy to address the deficits listed in the problem list box on initial evaluation, provide pt/family education and to maximize pt's level of independence in the home and community environment.     Pt's spiritual, cultural and educational needs considered and pt agreeable to plan of care and goals.     Anticipated barriers to physical therapy: none    Goals:  Short Term Goals: 5 weeks   - Pt will demonstrate excellent knowledge and adherence to HEP to facilitate optimal recovery. Progressing  - Pt will demonstrate proper PFM contraction, relaxation, and lengthening coordinated with TA and breath for improved muscle coordination needed for functional activity. Progressing  - Pt to demonstrate proper positioning on commode with breathing techniques to decrease strain with BM to enable pt to feel empty after BM. Progressing        Long Term Goals: 10 weeks   - Pt will demonstrate excellent knowledge and adherence to HEP for continued self-maintenance of symptoms. Progressing  - Pt to show proper breathing to promote relaxation and pelvic floor functional mobility and to help with calming the nervous system in order to decrease pain and improve QOL. Progressing  - Pt will report improved  ability to initiate urine stream and completely empty without straining to demonstrate improved PF relaxation for proper voiding. Progressing  - Pt to report a decrease in urinary frequency only once every 2-4 hours to improve ability to participate in social activities. Progressing  - Pt will report FOTO score PFDI Urinary 15 limited or less indicating clinically relevant increase in function. Progressing  - Pt will report voiding interval of 2-3 hours for improved ADL tolerance. Progressing  - Pt will report bearing down appropriately 100% of the time for improved bowel function and decreased stress on adjacent pelvic structures. Progressing     PLAN      Plan of care Certification: 10/19/2023 to 12/28/2023.     Outpatient Physical Therapy 1 time(s) every 1-2 week(s) for 10 weeks to include the following interventions: Electrical Stimulation TENS/IFC, Manual Therapy, Moist Heat/ Ice, Neuromuscular Re-ed, Patient Education, Self Care, Therapeutic Activities, and Therapeutic Exercise.     Stevie Calero, PT

## 2023-12-21 ENCOUNTER — CLINICAL SUPPORT (OUTPATIENT)
Dept: REHABILITATION | Facility: HOSPITAL | Age: 23
End: 2023-12-21
Payer: COMMERCIAL

## 2023-12-21 DIAGNOSIS — M62.89 PELVIC FLOOR DYSFUNCTION: Primary | ICD-10-CM

## 2023-12-21 PROCEDURE — 97112 NEUROMUSCULAR REEDUCATION: CPT | Mod: PN

## 2023-12-21 NOTE — PROGRESS NOTES
"  Pelvic Health Physical Therapy   Treatment Note     Name: Rain Garcia  Clinic Number: [id number]    Therapy Diagnosis:   Encounter Diagnosis   Name Primary?    Pelvic floor dysfunction Yes       Physician: Maria Eugenia Pope MD    Visit Date: 12/21/2023    Physician Orders: PT Eval and Treat   Medical Diagnosis from Referral: Uterine prolapse [N81.4], Urinary frequency [R35.0]  Evaluation Date: 10/19/2023  Authorization Period Expiration: 12/31/2023  Plan of Care Expiration: 12/28/2023 NEXT VISIT  Progress Note Due: 12/28/2023  Visit # / Visits authorized: 6/20 + eval   FOTO: Issued Visit #: 2 /3    Limitation/Restriction for FOTO Pelvic Floor Survey     Therapist reviewed FOTO scores for Rain Garcia on 10/19/2023.   FOTO documents entered into [employer] - see Media section.     Limitation Score:               Precautions: Standard     Time In: 9:46 am   Time Out: 10:36 am  Total Appointment Time (timed & untimed codes): 40 minutes + 10 minutes heat    Subjective     Pt reports: Sickness this week. Rough cycle this week. Increased urinary frequency and bowel frequency. Low back pain has been rough the past week but not too bad today. Feels a little shoot of pain when bending with lightheadedness. Upper thoracic pain lately.     She was compliant with home exercise program.  Response to previous treatment: no adverse symptoms   Functional change: no change    Pain: 5/10     Location: low back     Objective     Objective Measures updated at progress report unless specified.      Transverse abdominus strength: good to excellent     Treatment     Rain participated in neuromuscular re-education activities to develop Coordination, Control, Down training, Proprioception, and Sense for 40 minutes including:     Seated scapular retractions red TheraBand 2x10   Openbooks x15  Clamshells green ThreaBand 2x10   Modified Side planks 2x20" ea.   Table top hold 3x20"  Seated ball paloff press 30# 2x10  Bridges on ball " "with TA contraction and deep breathing technique 2x10  Maribel pose  Thread the needle  Sidelying Rainbows x10 ea.       Not today:  Diaphragmatic breathing x15  TA contractions in supine x15  LTRs with deep breathing x20  Return to running exercises:  Single leg bridge x10 ea.  Single leg heel raises x10 ea.  Single leg squats to mat x10 ea.  Sidelying hip abduction x10 ea.     Double leg heel raises x10  Swissball rollouts 10x ea. Direction   TRX squats 3x10  Paloff press purple band 2x10 ea.  RDLs with toe support x10 ea.   Diaphragmatic breathing  Happy Baby pose 2 min  Posterior pelvic tilts with DB + TA contraction x10  Supine piriformis stretch with added leg raise 2x30"  Mammoth stretch x30" ea.     Not today:  Prone press ups with Diaphragmatic breathing  Supine piriformis stretch modified  Mammoth stretch - noted increased tension on L > R  Dead bug - slight increase in low back pain   Bird dog - slight pelvic drop on the right   Bridges with TA contraction + DB x20  Cat cow stretch with DB  Maribel pose with DB  Seated therapy ball exercises- posterior pelvic tilts and lateral tilts     Rain participated in dynamic functional therapeutic activities to improve functional performance for 00 minutes, including:    Education on colon massage and demonstrated technique. Handout given.  Educated on decreasing "just in case" voiding. Talked about only voiding every other appointment on Saturday's.    Educated patient on types of lubrication.     Rain received the following manual therapy techniques: to develop flexibility and extensibility for 00 minutes including:     STM to bilateral lumbar paraspinals     Hot pack 10 minutes to low back in prone.       Home Exercises Provided and Patient Education Provided     Education provided:   - bladder irritants, anatomy/physiology of pelvic floor, bladder retraining, diaphragmatic breathing, double voiding techniques, isometric abdominal exercises, kegels, perineal " stretching/massage, proper bearing down techniques, and fluid intake/dietary modifications  Discussed progression of plan of care with patient; educated pt in activity modification; reviewed HEP with pt. Pt demonstrated and verbalized understanding of all instruction and was provided with a handout of HEP (see Patient Instructions).   - HEP provided prior visit.     Written Home Exercises Provided: yes.  Exercises were reviewed and Rain was able to demonstrate them prior to the end of the session.  Rain demonstrated good  understanding of the education provided.     See EMR under Media for exercises provided prior visit.    Assessment     Rain tolerated therapy session well with reports of increased upper thoracic pain this past week. Focused on posture awareness and thoracic paraspinal flexibility. Patient showing good tolerance to progression of core and lower extremity strengthening interventions. Continue progressing in POC as tolerated.     Rain Is progressing well towards her goals.   Pt prognosis is Good.     Pt will continue to benefit from skilled outpatient physical therapy to address the deficits listed in the problem list box on initial evaluation, provide pt/family education and to maximize pt's level of independence in the home and community environment.     Pt's spiritual, cultural and educational needs considered and pt agreeable to plan of care and goals.     Anticipated barriers to physical therapy: none    Goals:  Short Term Goals: 5 weeks   - Pt will demonstrate excellent knowledge and adherence to HEP to facilitate optimal recovery. Progressing  - Pt will demonstrate proper PFM contraction, relaxation, and lengthening coordinated with TA and breath for improved muscle coordination needed for functional activity. Progressing  - Pt to demonstrate proper positioning on commode with breathing techniques to decrease strain with BM to enable pt to feel empty after BM. Progressing        Long Term  Goals: 10 weeks   - Pt will demonstrate excellent knowledge and adherence to HEP for continued self-maintenance of symptoms. Progressing  - Pt to show proper breathing to promote relaxation and pelvic floor functional mobility and to help with calming the nervous system in order to decrease pain and improve QOL. Progressing  - Pt will report improved ability to initiate urine stream and completely empty without straining to demonstrate improved PF relaxation for proper voiding. Progressing  - Pt to report a decrease in urinary frequency only once every 2-4 hours to improve ability to participate in social activities. Progressing  - Pt will report FOTO score PFDI Urinary 15 limited or less indicating clinically relevant increase in function. Progressing  - Pt will report voiding interval of 2-3 hours for improved ADL tolerance. Progressing  - Pt will report bearing down appropriately 100% of the time for improved bowel function and decreased stress on adjacent pelvic structures. Progressing     PLAN      Plan of care Certification: 10/19/2023 to 12/28/2023.     Outpatient Physical Therapy 1 time(s) every 1-2 week(s) for 10 weeks to include the following interventions: Electrical Stimulation TENS/IFC, Manual Therapy, Moist Heat/ Ice, Neuromuscular Re-ed, Patient Education, Self Care, Therapeutic Activities, and Therapeutic Exercise.     Stevie Calero, PT